# Patient Record
Sex: MALE | Race: WHITE | NOT HISPANIC OR LATINO | Employment: OTHER | URBAN - METROPOLITAN AREA
[De-identification: names, ages, dates, MRNs, and addresses within clinical notes are randomized per-mention and may not be internally consistent; named-entity substitution may affect disease eponyms.]

---

## 2023-11-13 DIAGNOSIS — F03.90 DEMENTIA, UNSPECIFIED DEMENTIA SEVERITY, UNSPECIFIED DEMENTIA TYPE, UNSPECIFIED WHETHER BEHAVIORAL, PSYCHOTIC, OR MOOD DISTURBANCE OR ANXIETY (MULTI): Primary | ICD-10-CM

## 2023-11-13 RX ORDER — DONEPEZIL HYDROCHLORIDE 10 MG/1
10 TABLET, FILM COATED ORAL DAILY
Qty: 90 TABLET | Refills: 0 | Status: SHIPPED | OUTPATIENT
Start: 2023-11-13 | End: 2024-04-05

## 2023-12-08 ENCOUNTER — OFFICE VISIT (OUTPATIENT)
Dept: NEUROLOGY | Facility: CLINIC | Age: 88
End: 2023-12-08
Payer: MEDICARE

## 2023-12-08 VITALS
SYSTOLIC BLOOD PRESSURE: 157 MMHG | DIASTOLIC BLOOD PRESSURE: 69 MMHG | HEIGHT: 69 IN | WEIGHT: 190 LBS | BODY MASS INDEX: 28.14 KG/M2 | TEMPERATURE: 97.2 F

## 2023-12-08 DIAGNOSIS — G31.84 MILD COGNITIVE IMPAIRMENT: ICD-10-CM

## 2023-12-08 DIAGNOSIS — Z86.73 HISTORY OF STROKE: Primary | ICD-10-CM

## 2023-12-08 PROCEDURE — 1159F MED LIST DOCD IN RCRD: CPT | Performed by: STUDENT IN AN ORGANIZED HEALTH CARE EDUCATION/TRAINING PROGRAM

## 2023-12-08 PROCEDURE — 1160F RVW MEDS BY RX/DR IN RCRD: CPT | Performed by: STUDENT IN AN ORGANIZED HEALTH CARE EDUCATION/TRAINING PROGRAM

## 2023-12-08 PROCEDURE — 99214 OFFICE O/P EST MOD 30 MIN: CPT | Performed by: STUDENT IN AN ORGANIZED HEALTH CARE EDUCATION/TRAINING PROGRAM

## 2023-12-08 ASSESSMENT — ENCOUNTER SYMPTOMS
DEPRESSION: 0
LOSS OF SENSATION IN FEET: 0
OCCASIONAL FEELINGS OF UNSTEADINESS: 0

## 2023-12-08 NOTE — LETTER
"December 8, 2023     Neri Farley MD  [id number] [facility] [id number]    Patient: Casey Yo   YOB: 1935   Date of Visit: 12/8/2023       Dear Dr. Neri Farley MD:    Thank you for referring Casey Yo to me for evaluation. Below are my notes for this consultation.  If you have questions, please do not hesitate to call me. I look forward to following your patient along with you.     I have previous notes indicating he has paroxysmal atrial fibrillation but now I cannot find his cardiac monitor results. If that is the case, he should be on apixiban 2.5mg BID (age >80; Cr >1.5) for secondary stroke prevention.     Sincerely,     Gerda Solano MD      CC: No Recipients  ______________________________________________________________________________________    Subjective    Casey Yo is a 87 y.o. year old male for follow-up of stroke and memory loss.     He was last seen 5/26/2023. Since then, he remains neurologically stable. He has had no new stroke symptoms. 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 His son complains of him being forgetful to 's wife. But he still drives, manages his own medications. They are not sure of the medications, left the pill bottles at home. They do no believe he is on aspirin, clopidogrel or apixiban.     He is undergoing evaluation for TAVR at [facility] and then wants to transition his Cardiology care back to . He has shortness of breath on exertion and fatigue.     \"He was hospitalized 11/7-9/2022 for transient episode of left facial droop, dysarthria and left arm numbness lasting about 15 minutes. MRI showed small infarct in L occipital lobe. Carotid ultrasound had <50% stenosis bilaterally. TTE showed normal LVEF, no PFO but did not assess LA size. He was on aspirin and clopidogrel x21 days and completed this treatment.      12/13/2022 - Stroke prevention clinic. He is back to his neurological " "baseline, no residual deficits. He has had no further episodes since hospitalization in November. He currently has a ziopatch in place. BP control has been variable, he is intermittently compliant with his BP medications and low salt diet. He has intermittent light headedness. He cannot pinpoint the dizziness to any specific position change but it is worse in the afternoon to evening. He has not been driving since then.      05/26/2023 - Accompanied to clinic by his wife. Continues to have mild memory loss, confusion with familiar places when driving. Wife reports he has good decision making with judgment and driving laws when she is in the car. He is able to correct himself and figure out where he is going. He continues to work as an , no difficulties. Cardiac monitor returned with \"intermittent atrial fibrillation with controlled ventricular rate, sinus rhythm with 1st AV block\". He remains on clopidogrel and statin.\"    Patient Active Problem List   Diagnosis   • History of stroke   • Mild cognitive impairment       Objective  Neurological Exam  Mental Status  Awake, alert and oriented to person, place and time. Speech is normal.    Cranial Nerves  CN II: Visual fields full to confrontation.  CN III, IV, VI: Extraocular movements intact bilaterally.  CN V: Facial sensation is normal.  CN VII: Full and symmetric facial movement.  CN VIII: Hearing is normal.  CN IX, X: Palate elevates symmetrically  CN XI: Shoulder shrug strength is normal.  CN XII: Tongue midline without atrophy or fasciculations.    Motor   Strength is 5/5 throughout all four extremities.    Sensory  Light touch is normal in upper and lower extremities.     Coordination  Right: Finger-to-nose normal.Left: Finger-to-nose normal.    Physical Exam  Eyes:      Extraocular Movements: Extraocular movements intact.   Neurological:      Motor: Motor strength is normal.  Psychiatric:         Speech: Speech normal. "         Assessment/Plan  Diagnoses and all orders for this visit:  History of stroke  Mild cognitive impairment    Mild cognitive impairment: overall stable. Continues to work and drive. Wife reports no issues (though unclear her cognitive baseline as well). Will continue to monitor. Consider OT driving evaluation. Continue donepezil 10mg daily  History of cardioembolic stroke: ziopatch positive for paroxysmal atrial fibrillation (though I can no longer find documentation of the cardiac monitor in [facility]). Recommend changing clopidogrel to apixiban 5mg BID. Family to call in with medication list this afternoon as they were unsure if he was on an antithrombotic.     Follow-up in 6 months

## 2023-12-08 NOTE — PROGRESS NOTES
"Subjective     Casey Yo is a 87 y.o. year old male for follow-up of stroke and memory loss.     He was last seen 5/26/2023. Since then, he remains neurologically stable. He has had no new stroke symptoms. 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 His son complains of him being forgetful to 's wife. But he still drives, manages his own medications. They are not sure of the medications, left the pill bottles at home. They do no believe he is on aspirin, clopidogrel or apixiban.     He is undergoing evaluation for TAVR at [facility] and then wants to transition his Cardiology care back to . He has shortness of breath on exertion and fatigue.     \"He was hospitalized 11/7-9/2022 for transient episode of left facial droop, dysarthria and left arm numbness lasting about 15 minutes. MRI showed small infarct in L occipital lobe. Carotid ultrasound had <50% stenosis bilaterally. TTE showed normal LVEF, no PFO but did not assess LA size. He was on aspirin and clopidogrel x21 days and completed this treatment.      12/13/2022 - Stroke prevention clinic. He is back to his neurological baseline, no residual deficits. He has had no further episodes since hospitalization in November. He currently has a ziopatch in place. BP control has been variable, he is intermittently compliant with his BP medications and low salt diet. He has intermittent light headedness. He cannot pinpoint the dizziness to any specific position change but it is worse in the afternoon to evening. He has not been driving since then.      05/26/2023 - Accompanied to clinic by his wife. Continues to have mild memory loss, confusion with familiar places when driving. Wife reports he has good decision making with judgment and driving laws when she is in the car. He is able to correct himself and figure out where he is going. He continues to work as an , no difficulties. Cardiac monitor returned with " "\"intermittent atrial fibrillation with controlled ventricular rate, sinus rhythm with 1st AV block\". He remains on clopidogrel and statin.\"    Patient Active Problem List   Diagnosis    History of stroke    Mild cognitive impairment       Objective   Neurological Exam  Mental Status  Awake, alert and oriented to person, place and time. Speech is normal.    Cranial Nerves  CN II: Visual fields full to confrontation.  CN III, IV, VI: Extraocular movements intact bilaterally.  CN V: Facial sensation is normal.  CN VII: Full and symmetric facial movement.  CN VIII: Hearing is normal.  CN IX, X: Palate elevates symmetrically  CN XI: Shoulder shrug strength is normal.  CN XII: Tongue midline without atrophy or fasciculations.    Motor   Strength is 5/5 throughout all four extremities.    Sensory  Light touch is normal in upper and lower extremities.     Coordination  Right: Finger-to-nose normal.Left: Finger-to-nose normal.    Physical Exam  Eyes:      Extraocular Movements: Extraocular movements intact.   Neurological:      Motor: Motor strength is normal.  Psychiatric:         Speech: Speech normal.         Assessment/Plan   Diagnoses and all orders for this visit:  History of stroke  Mild cognitive impairment    Mild cognitive impairment: overall stable. Continues to work and drive. Wife reports no issues (though unclear her cognitive baseline as well). Will continue to monitor. Consider OT driving evaluation. Continue donepezil 10mg daily  History of cardioembolic stroke: ziopatch positive for paroxysmal atrial fibrillation (though I can no longer find documentation of the cardiac monitor in [facility]). Recommend changing clopidogrel to apixiban 2.5mg BID (meets criteria for low dose given age >80 and Cr >1.5). Family to call in with medication list this afternoon as they were unsure if he was on an antithrombotic.     Follow-up in 6 months   "

## 2023-12-11 RX ORDER — PANTOPRAZOLE SODIUM 40 MG/1
40 TABLET, DELAYED RELEASE ORAL
COMMUNITY
Start: 2017-02-10

## 2023-12-11 RX ORDER — CLOPIDOGREL BISULFATE 75 MG/1
75 TABLET ORAL DAILY
COMMUNITY
Start: 2023-09-11

## 2023-12-11 RX ORDER — AMLODIPINE BESYLATE 10 MG/1
10 TABLET ORAL DAILY
COMMUNITY
Start: 2022-10-05

## 2023-12-11 RX ORDER — ATORVASTATIN CALCIUM 40 MG/1
40 TABLET, FILM COATED ORAL DAILY
COMMUNITY
Start: 2022-12-13

## 2024-04-04 DIAGNOSIS — F03.90 DEMENTIA, UNSPECIFIED DEMENTIA SEVERITY, UNSPECIFIED DEMENTIA TYPE, UNSPECIFIED WHETHER BEHAVIORAL, PSYCHOTIC, OR MOOD DISTURBANCE OR ANXIETY (MULTI): ICD-10-CM

## 2024-04-05 RX ORDER — DONEPEZIL HYDROCHLORIDE 10 MG/1
10 TABLET, FILM COATED ORAL DAILY
Qty: 90 TABLET | Refills: 0 | Status: SHIPPED | OUTPATIENT
Start: 2024-04-05

## 2024-06-12 ENCOUNTER — APPOINTMENT (OUTPATIENT)
Dept: NEUROLOGY | Facility: CLINIC | Age: 89
End: 2024-06-12
Payer: MEDICARE

## 2025-01-16 ENCOUNTER — APPOINTMENT (OUTPATIENT)
Dept: NEUROLOGY | Facility: CLINIC | Age: 89
End: 2025-01-16
Payer: MEDICARE

## 2025-01-16 VITALS
HEIGHT: 69 IN | DIASTOLIC BLOOD PRESSURE: 68 MMHG | HEART RATE: 84 BPM | SYSTOLIC BLOOD PRESSURE: 136 MMHG | BODY MASS INDEX: 26.96 KG/M2 | WEIGHT: 182 LBS | TEMPERATURE: 97.1 F

## 2025-01-16 DIAGNOSIS — Z71.89 ADVANCE CARE PLANNING: ICD-10-CM

## 2025-01-16 DIAGNOSIS — F03.90 DEMENTIA WITHOUT BEHAVIORAL DISTURBANCE (MULTI): Primary | ICD-10-CM

## 2025-01-16 PROBLEM — N18.30 CHRONIC KIDNEY DISEASE, STAGE 3 UNSPECIFIED (MULTI): Status: ACTIVE | Noted: 2023-06-29

## 2025-01-16 PROBLEM — K21.9 GERD (GASTROESOPHAGEAL REFLUX DISEASE): Status: ACTIVE | Noted: 2023-06-29

## 2025-01-16 PROBLEM — Z95.2 S/P TAVR (TRANSCATHETER AORTIC VALVE REPLACEMENT): Status: ACTIVE | Noted: 2024-08-06

## 2025-01-16 PROBLEM — E78.2 MIXED HYPERLIPIDEMIA: Status: ACTIVE | Noted: 2023-06-29

## 2025-01-16 PROBLEM — M54.12 CERVICAL RADICULOPATHY: Status: ACTIVE | Noted: 2024-06-28

## 2025-01-16 PROBLEM — R41.3 MEMORY LOSS: Status: ACTIVE | Noted: 2023-06-29

## 2025-01-16 PROBLEM — R53.82 CHRONIC FATIGUE: Status: ACTIVE | Noted: 2023-06-29

## 2025-01-16 PROBLEM — I35.0 SEVERE AORTIC STENOSIS: Status: ACTIVE | Noted: 2024-03-12

## 2025-01-16 PROBLEM — I10 PRIMARY HYPERTENSION: Status: ACTIVE | Noted: 2023-06-29

## 2025-01-16 PROBLEM — G44.229 CHRONIC TENSION-TYPE HEADACHE, NOT INTRACTABLE: Status: ACTIVE | Noted: 2023-06-29

## 2025-01-16 PROBLEM — N40.0 BENIGN PROSTATIC HYPERPLASIA WITHOUT URINARY OBSTRUCTION: Status: ACTIVE | Noted: 2023-06-29

## 2025-01-16 PROBLEM — K86.9 PANCREATIC LESION (HHS-HCC): Status: ACTIVE | Noted: 2024-08-05

## 2025-01-16 PROBLEM — I50.33 ACUTE ON CHRONIC HEART FAILURE WITH PRESERVED EJECTION FRACTION: Status: ACTIVE | Noted: 2024-08-08

## 2025-01-16 PROBLEM — M17.9 OSTEOARTHRITIS OF KNEE: Status: ACTIVE | Noted: 2023-06-29

## 2025-01-16 PROBLEM — K22.710 BARRETT'S ESOPHAGUS WITH LOW GRADE DYSPLASIA: Status: ACTIVE | Noted: 2024-12-09

## 2025-01-16 PROBLEM — G31.84 MILD COGNITIVE IMPAIRMENT: Status: RESOLVED | Noted: 2023-12-08 | Resolved: 2025-01-16

## 2025-01-16 PROBLEM — S06.5XAA SUBDURAL HEMATOMA (MULTI): Status: ACTIVE | Noted: 2025-01-16

## 2025-01-16 PROCEDURE — 99497 ADVNCD CARE PLAN 30 MIN: CPT | Performed by: STUDENT IN AN ORGANIZED HEALTH CARE EDUCATION/TRAINING PROGRAM

## 2025-01-16 PROCEDURE — G2211 COMPLEX E/M VISIT ADD ON: HCPCS | Performed by: STUDENT IN AN ORGANIZED HEALTH CARE EDUCATION/TRAINING PROGRAM

## 2025-01-16 PROCEDURE — 99214 OFFICE O/P EST MOD 30 MIN: CPT | Performed by: STUDENT IN AN ORGANIZED HEALTH CARE EDUCATION/TRAINING PROGRAM

## 2025-01-16 PROCEDURE — 3075F SYST BP GE 130 - 139MM HG: CPT | Performed by: STUDENT IN AN ORGANIZED HEALTH CARE EDUCATION/TRAINING PROGRAM

## 2025-01-16 PROCEDURE — 1159F MED LIST DOCD IN RCRD: CPT | Performed by: STUDENT IN AN ORGANIZED HEALTH CARE EDUCATION/TRAINING PROGRAM

## 2025-01-16 PROCEDURE — 3078F DIAST BP <80 MM HG: CPT | Performed by: STUDENT IN AN ORGANIZED HEALTH CARE EDUCATION/TRAINING PROGRAM

## 2025-01-16 PROCEDURE — 1036F TOBACCO NON-USER: CPT | Performed by: STUDENT IN AN ORGANIZED HEALTH CARE EDUCATION/TRAINING PROGRAM

## 2025-01-16 PROCEDURE — 1160F RVW MEDS BY RX/DR IN RCRD: CPT | Performed by: STUDENT IN AN ORGANIZED HEALTH CARE EDUCATION/TRAINING PROGRAM

## 2025-01-16 ASSESSMENT — PATIENT HEALTH QUESTIONNAIRE - PHQ9
2. FEELING DOWN, DEPRESSED OR HOPELESS: NOT AT ALL
SUM OF ALL RESPONSES TO PHQ9 QUESTIONS 1 & 2: 0
1. LITTLE INTEREST OR PLEASURE IN DOING THINGS: NOT AT ALL

## 2025-01-16 ASSESSMENT — LIFESTYLE VARIABLES
AUDIT-C TOTAL SCORE: 0
HOW OFTEN DO YOU HAVE A DRINK CONTAINING ALCOHOL: NEVER
HOW MANY STANDARD DRINKS CONTAINING ALCOHOL DO YOU HAVE ON A TYPICAL DAY: PATIENT DOES NOT DRINK
HOW OFTEN DO YOU HAVE SIX OR MORE DRINKS ON ONE OCCASION: NEVER
SKIP TO QUESTIONS 9-10: 1

## 2025-01-16 NOTE — PROGRESS NOTES
"   [facility]   Referring: No ref. provider found  PCP: Neri Farley MD  Chief Complaint   Patient presents with    Stroke       Subjective   Casey Yo is a 89 y.o. year old male presenting for visit for follow-up .     He was last seen 12/8/2023. He is accompanied to clinic by his daughter.     Memory has gotten worse. He continues to live at home with his wife. He has three daughters that support them. One of the children is at the house 5-7 days per week. Both he and his wife have stopped driving. He has trouble remembering appointments. His son Felipe assists with medications.     He had a TAVR recently, tolerated well, will be starting cardiac rehab soon. He is ambulating with a walker. His son and his wife do most of the finances. Family drops food off and they are no longer cook but can operate a microwave. He is independent with ADLs. Son assists with ambulating on the stairs until he goes through PT.     He has no hallucinations, paranoia or behavioral irritability.     \"He was hospitalized 11/7-9/2022 for transient episode of left facial droop, dysarthria and left arm numbness lasting about 15 minutes. MRI showed small infarct in L occipital lobe. Carotid ultrasound had <50% stenosis bilaterally. TTE showed normal LVEF, no PFO but did not assess LA size. He was on aspirin and clopidogrel x21 days and completed this treatment.      12/13/2022 - Stroke prevention clinic. He is back to his neurological baseline, no residual deficits. He has had no further episodes since hospitalization in November. He currently has a ziopatch in place. BP control has been variable, he is intermittently compliant with his BP medications and low salt diet. He has intermittent light headedness. He cannot pinpoint the dizziness to any specific position change but it is worse in the afternoon to evening. He has not been driving since then.      05/26/2023 - Accompanied to clinic by his wife. Continues to " "have mild memory loss, confusion with familiar places when driving. Wife reports he has good decision making with judgment and driving laws when she is in the car. He is able to correct himself and figure out where he is going. He continues to work as an , no difficulties. Cardiac monitor returned with \"intermittent atrial fibrillation with controlled ventricular rate, sinus rhythm with 1st AV block\". He remains on clopidogrel and statin.\"     Patient Active Problem List   Diagnosis    History of stroke    Acute on chronic heart failure with preserved ejection fraction    Hollins's esophagus with low grade dysplasia    Benign prostatic hyperplasia without urinary obstruction    Cervical radiculopathy    Chronic fatigue    Chronic kidney disease, stage 3 unspecified (Multi)    Chronic tension-type headache, not intractable    GERD (gastroesophageal reflux disease)    Memory loss    Mixed hyperlipidemia    Osteoarthritis of knee    Pancreatic lesion (HHS-HCC)    Primary hypertension    S/P TAVR (transcatheter aortic valve replacement)    Severe aortic stenosis    Subdural hematoma (Multi)     Objective   Neurological Exam  Mental Status  Awake and alert. Speech is normal.  Oriented to person  Oriented to month - January. Did not know the year   Oriented to place .    Cranial Nerves  CN III, IV, VI: Extraocular movements intact bilaterally.  CN VII: Full and symmetric facial movement.  CN VIII: Hearing is normal.    Motor   Strength is 5/5 throughout all four extremities.    Gait  Casual gait is normal including stance, stride, and arm swing.    Physical Exam  Constitutional:       General: He is awake.   Eyes:      Extraocular Movements: Extraocular movements intact.   Neurological:      Mental Status: He is alert.      Motor: Motor strength is normal.  Psychiatric:         Speech: Speech normal.       Assessment/Plan   Diagnoses and all orders for this visit:  Dementia without behavioral disturbance " (Multi)      Dementia without behavioral disturbances: has had some progressive decline. No longer working and driving. Requires assistance with iADLs but independent with ADLs. No behavioral concerns. Will continue donepezil 10mg daily. Discussed advanced care planning including code status. Provided [place] DNR form to take home to discuss.     Follow-up in 6-8 months     There are no Patient Instructions on file for this visit.

## 2025-01-21 ENCOUNTER — APPOINTMENT (OUTPATIENT)
Dept: NEUROLOGY | Facility: CLINIC | Age: 89
End: 2025-01-21
Payer: MEDICARE

## 2025-04-16 ENCOUNTER — OFFICE VISIT (OUTPATIENT)
Dept: URGENT CARE | Age: 89
End: 2025-04-16
Payer: MEDICARE

## 2025-04-16 VITALS
HEART RATE: 87 BPM | DIASTOLIC BLOOD PRESSURE: 70 MMHG | RESPIRATION RATE: 18 BRPM | SYSTOLIC BLOOD PRESSURE: 142 MMHG | TEMPERATURE: 97.8 F | OXYGEN SATURATION: 97 % | BODY MASS INDEX: 26.88 KG/M2 | WEIGHT: 182 LBS

## 2025-04-16 DIAGNOSIS — R30.0 DYSURIA: ICD-10-CM

## 2025-04-16 DIAGNOSIS — N36.8 IRRITATION OF URETHRA: ICD-10-CM

## 2025-04-16 LAB
POC APPEARANCE, URINE: ABNORMAL
POC BILIRUBIN, URINE: ABNORMAL
POC BLOOD, URINE: NEGATIVE
POC COLOR, URINE: YELLOW
POC GLUCOSE, URINE: NEGATIVE MG/DL
POC KETONES, URINE: NEGATIVE MG/DL
POC LEUKOCYTES, URINE: ABNORMAL
POC NITRITE,URINE: NEGATIVE
POC PROTEIN, URINE: NEGATIVE MG/DL
POC UROBILINOGEN, URINE: 0.2 EU/DL

## 2025-04-16 NOTE — PROGRESS NOTES
Subjective   Patient ID: Casey Yo is a 89 y.o. male who is here with his father. He presents today with a chief complaint of Urinary Problem (Had a uti 1 month ago - worried it is back ).    History of Present Illness  Subjective  Casey Yo is a 89 y.o. male who presents for urinalysis. Patient does not have a history of recurrent UTI or pyelonephritis. He was treated for a UTI approximately 1 month ago by PCP.       Objective  /70   Pulse 87   Temp 36.6 °C (97.8 °F)   Resp 18   Wt 82.6 kg (182 lb)   SpO2 97%   BMI 26.88 kg/m²    General: alert and oriented, in no acute distress  Abdomen: soft, non-tender, without masses or organomegaly  Back: CVA tenderness absent    Laboratory:   Urine dipstick shows +leukocytes.      Assessment/Plan  Diagnoses and associated orders for this visit:    · Dysuria   POCT UA Automated manually resulted   Urine Culture    · Irritation of urethra   POCT UA Automated manually resulted                Past Medical History  Allergies as of 04/16/2025    (No Known Allergies)       Prescriptions Prior to Admission[1]     Medical History[2]    Surgical History[3]     reports that he has never smoked. He has never used smokeless tobacco. He reports that he does not currently use alcohol. He reports that he does not use drugs.    Review of Systems  Review of Systems                               Objective    Vitals:    04/16/25 1913   BP: 142/70   Pulse: 87   Resp: 18   Temp: 36.6 °C (97.8 °F)   SpO2: 97%   Weight: 82.6 kg (182 lb)     No LMP for male patient.    Physical Exam    Procedures    Point of Care Test & Imaging Results from this visit  No results found for this visit on 04/16/25.   Imaging  No results found.    Cardiology, Vascular, and Other Imaging  No other imaging results found for the past 2 days      Diagnostic study results (if any) were reviewed by Estelita Leiva MD.    Assessment/Plan   Allergies, medications, history, and pertinent labs/EKGs/Imaging  reviewed by Estelita Leiva MD.     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

## 2025-04-18 LAB — BACTERIA UR CULT: NORMAL

## 2025-10-09 ENCOUNTER — APPOINTMENT (OUTPATIENT)
Dept: NEUROLOGY | Facility: CLINIC | Age: 89
End: 2025-10-09
Payer: MEDICARE